# Patient Record
Sex: MALE | Race: BLACK OR AFRICAN AMERICAN
[De-identification: names, ages, dates, MRNs, and addresses within clinical notes are randomized per-mention and may not be internally consistent; named-entity substitution may affect disease eponyms.]

---

## 2019-06-22 ENCOUNTER — HOSPITAL ENCOUNTER (EMERGENCY)
Dept: HOSPITAL 74 - JERFT | Age: 59
Discharge: HOME | End: 2019-06-22
Payer: COMMERCIAL

## 2019-06-22 VITALS — SYSTOLIC BLOOD PRESSURE: 138 MMHG | DIASTOLIC BLOOD PRESSURE: 95 MMHG | HEART RATE: 73 BPM

## 2019-06-22 VITALS — TEMPERATURE: 98.4 F

## 2019-06-22 VITALS — BODY MASS INDEX: 38.4 KG/M2

## 2019-06-22 DIAGNOSIS — E11.9: ICD-10-CM
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
## 2019-06-22 DIAGNOSIS — E78.00: ICD-10-CM

## 2019-06-22 DIAGNOSIS — Z79.84: ICD-10-CM
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
## 2019-06-22 DIAGNOSIS — J45.909: ICD-10-CM

## 2019-06-22 DIAGNOSIS — S43.52XA: Primary | ICD-10-CM
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
